# Patient Record
Sex: FEMALE | Race: BLACK OR AFRICAN AMERICAN | NOT HISPANIC OR LATINO | Employment: FULL TIME | URBAN - METROPOLITAN AREA
[De-identification: names, ages, dates, MRNs, and addresses within clinical notes are randomized per-mention and may not be internally consistent; named-entity substitution may affect disease eponyms.]

---

## 2018-01-19 ENCOUNTER — HOSPITAL ENCOUNTER (EMERGENCY)
Facility: HOSPITAL | Age: 43
Discharge: PSYCHIATRIC OR SUBSTANCE USE FACILITY | End: 2018-01-20
Attending: EMERGENCY MEDICINE
Payer: MEDICAID

## 2018-01-19 DIAGNOSIS — F29 PSYCHOSIS, UNSPECIFIED PSYCHOSIS TYPE: Primary | ICD-10-CM

## 2018-01-19 LAB
ALBUMIN SERPL BCP-MCNC: 3.6 G/DL
ALP SERPL-CCNC: 66 U/L
ALT SERPL W/O P-5'-P-CCNC: 24 U/L
AMPHET+METHAMPHET UR QL: NEGATIVE
ANION GAP SERPL CALC-SCNC: 9 MMOL/L
APAP SERPL-MCNC: <3 UG/ML
AST SERPL-CCNC: 21 U/L
B-HCG UR QL: NEGATIVE
BACTERIA #/AREA URNS HPF: ABNORMAL /HPF
BARBITURATES UR QL SCN>200 NG/ML: NEGATIVE
BASOPHILS # BLD AUTO: 0.01 K/UL
BASOPHILS NFR BLD: 0.1 %
BENZODIAZ UR QL SCN>200 NG/ML: NEGATIVE
BILIRUB SERPL-MCNC: 0.6 MG/DL
BILIRUB UR QL STRIP: NEGATIVE
BUN SERPL-MCNC: 21 MG/DL
BZE UR QL SCN: NEGATIVE
CALCIUM SERPL-MCNC: 9.3 MG/DL
CANNABINOIDS UR QL SCN: NEGATIVE
CHLORIDE SERPL-SCNC: 109 MMOL/L
CLARITY UR: CLEAR
CO2 SERPL-SCNC: 28 MMOL/L
COLOR UR: YELLOW
CREAT SERPL-MCNC: 0.8 MG/DL
CREAT UR-MCNC: 63.2 MG/DL
CTP QC/QA: YES
DIFFERENTIAL METHOD: ABNORMAL
EOSINOPHIL # BLD AUTO: 0 K/UL
EOSINOPHIL NFR BLD: 0.3 %
ERYTHROCYTE [DISTWIDTH] IN BLOOD BY AUTOMATED COUNT: 12.8 %
EST. GFR  (AFRICAN AMERICAN): >60 ML/MIN/1.73 M^2
EST. GFR  (NON AFRICAN AMERICAN): >60 ML/MIN/1.73 M^2
ETHANOL SERPL-MCNC: <10 MG/DL
GLUCOSE SERPL-MCNC: 84 MG/DL
GLUCOSE UR QL STRIP: NEGATIVE
HCT VFR BLD AUTO: 42.3 %
HGB BLD-MCNC: 14.1 G/DL
HGB UR QL STRIP: ABNORMAL
KETONES UR QL STRIP: NEGATIVE
LEUKOCYTE ESTERASE UR QL STRIP: ABNORMAL
LYMPHOCYTES # BLD AUTO: 1.3 K/UL
LYMPHOCYTES NFR BLD: 11.6 %
MCH RBC QN AUTO: 28.3 PG
MCHC RBC AUTO-ENTMCNC: 33.3 G/DL
MCV RBC AUTO: 85 FL
METHADONE UR QL SCN>300 NG/ML: NEGATIVE
MICROSCOPIC COMMENT: ABNORMAL
MONOCYTES # BLD AUTO: 0.8 K/UL
MONOCYTES NFR BLD: 7.2 %
NEUTROPHILS # BLD AUTO: 8.9 K/UL
NEUTROPHILS NFR BLD: 80.5 %
NITRITE UR QL STRIP: NEGATIVE
OPIATES UR QL SCN: NEGATIVE
PCP UR QL SCN>25 NG/ML: NEGATIVE
PH UR STRIP: 6 [PH] (ref 5–8)
PLATELET # BLD AUTO: 230 K/UL
PMV BLD AUTO: 11 FL
POTASSIUM SERPL-SCNC: 3.8 MMOL/L
PROT SERPL-MCNC: 7.6 G/DL
PROT UR QL STRIP: NEGATIVE
RBC # BLD AUTO: 4.98 M/UL
RBC #/AREA URNS HPF: 50 /HPF (ref 0–4)
SODIUM SERPL-SCNC: 146 MMOL/L
SP GR UR STRIP: 1.01 (ref 1–1.03)
SQUAMOUS #/AREA URNS HPF: 5 /HPF
TOXICOLOGY INFORMATION: NORMAL
TSH SERPL DL<=0.005 MIU/L-ACNC: 1.75 UIU/ML
URN SPEC COLLECT METH UR: ABNORMAL
UROBILINOGEN UR STRIP-ACNC: 1 EU/DL
WBC # BLD AUTO: 10.98 K/UL
WBC #/AREA URNS HPF: 7 /HPF (ref 0–5)

## 2018-01-19 PROCEDURE — 63600175 PHARM REV CODE 636 W HCPCS: Performed by: EMERGENCY MEDICINE

## 2018-01-19 PROCEDURE — 84443 ASSAY THYROID STIM HORMONE: CPT

## 2018-01-19 PROCEDURE — 81000 URINALYSIS NONAUTO W/SCOPE: CPT

## 2018-01-19 PROCEDURE — 81025 URINE PREGNANCY TEST: CPT | Performed by: EMERGENCY MEDICINE

## 2018-01-19 PROCEDURE — 99285 EMERGENCY DEPT VISIT HI MDM: CPT | Mod: 25

## 2018-01-19 PROCEDURE — 80320 DRUG SCREEN QUANTALCOHOLS: CPT

## 2018-01-19 PROCEDURE — 85025 COMPLETE CBC W/AUTO DIFF WBC: CPT

## 2018-01-19 PROCEDURE — 96372 THER/PROPH/DIAG INJ SC/IM: CPT

## 2018-01-19 PROCEDURE — 80053 COMPREHEN METABOLIC PANEL: CPT

## 2018-01-19 PROCEDURE — 80329 ANALGESICS NON-OPIOID 1 OR 2: CPT

## 2018-01-19 PROCEDURE — 25000003 PHARM REV CODE 250: Performed by: PSYCHIATRY & NEUROLOGY

## 2018-01-19 PROCEDURE — 80307 DRUG TEST PRSMV CHEM ANLYZR: CPT

## 2018-01-19 RX ORDER — RISPERIDONE 0.5 MG/1
1 TABLET ORAL 2 TIMES DAILY
Status: DISCONTINUED | OUTPATIENT
Start: 2018-01-19 | End: 2018-01-20 | Stop reason: HOSPADM

## 2018-01-19 RX ORDER — DIPHENHYDRAMINE HYDROCHLORIDE 50 MG/ML
25 INJECTION INTRAMUSCULAR; INTRAVENOUS
Status: COMPLETED | OUTPATIENT
Start: 2018-01-19 | End: 2018-01-19

## 2018-01-19 RX ORDER — ZIPRASIDONE MESYLATE 20 MG/ML
20 INJECTION, POWDER, LYOPHILIZED, FOR SOLUTION INTRAMUSCULAR
Status: COMPLETED | OUTPATIENT
Start: 2018-01-19 | End: 2018-01-19

## 2018-01-19 RX ADMIN — ZIPRASIDONE MESYLATE 20 MG: 20 INJECTION, POWDER, LYOPHILIZED, FOR SOLUTION INTRAMUSCULAR at 11:01

## 2018-01-19 RX ADMIN — RISPERIDONE 1 MG: 0.5 TABLET, FILM COATED ORAL at 10:01

## 2018-01-19 RX ADMIN — DIPHENHYDRAMINE HYDROCHLORIDE 25 MG: 50 INJECTION, SOLUTION INTRAMUSCULAR; INTRAVENOUS at 11:01

## 2018-01-19 NOTE — ED PROVIDER NOTES
Encounter Date: 2018    SCRIBE #1 NOTE: I, Chadd Valdez, am scribing for, and in the presence of,  Dr. Fallon Ni. I have scribed the entire note.       History     Chief Complaint   Patient presents with    Psychiatric Evaluation     PT presents to ED today via EMS who reports pt was found by [employer] at the [facility] claiming she was a resident there. Per [facility] pt is not a resident there. alert and oriented to self. denies HI/SI. denies taking any medications reports she eat a lot of vegetables.     Time patient was seen by the provider: 3:58 PM      The patient is a 43 y.o. female with co-morbidities including: psychiatric illness who presents to the ED via EMS for a psychiatric issue. Per EMS, who picked her up from [facility], she is alert and oriented, but rambling and seems dissociated. She appears paranoid and nervous. She was recently evicted, and told EMS she walked from [place] to [place]. She states she is in the ED currently for a regular check up. She denies any pain, denies taking any medicine. She states she lives alone. Patient seems dissociated, has not mentioned that she was picked up by EMS and is speaking about people that may or may not exist. Patient denies SI/HI.        The history is provided by the EMS personnel and the patient.     Review of patient's allergies indicates:  No Known Allergies  History reviewed. No pertinent past medical history.  Past Surgical History:   Procedure Laterality Date     SECTION, CLASSIC      TONSILLECTOMY       Family History   Problem Relation Age of Onset    Cancer Mother     Cancer Maternal Grandmother      Social History   Substance Use Topics    Smoking status: Never Smoker    Smokeless tobacco: Not on file    Alcohol use No     Review of Systems   Constitutional: Negative for fever.   HENT: Negative for sore throat.    Respiratory: Negative for shortness of breath.    Cardiovascular: Negative for chest pain.    Gastrointestinal: Negative for nausea.   Genitourinary: Negative for dysuria.   Musculoskeletal: Negative for back pain.   Skin: Negative for color change, rash and wound.   Neurological: Negative for weakness.   Hematological: Does not bruise/bleed easily.   Psychiatric/Behavioral: Negative for suicidal ideas. The patient is nervous/anxious (mildly).         Rambling, dissociated   All other systems reviewed and are negative.      Physical Exam     Initial Vitals [01/19/18 1543]   BP Pulse Resp Temp SpO2   (!) 155/84 (!) 113 20 97.9 °F (36.6 °C) 100 %      MAP       107.67         Physical Exam    Nursing note and vitals reviewed.  Constitutional: She appears well-developed and well-nourished.   HENT:   Head: Normocephalic and atraumatic.   Eyes: Conjunctivae and EOM are normal. Pupils are equal, round, and reactive to light. Right eye exhibits no discharge. Left eye exhibits no discharge. No scleral icterus.   Neck: Normal range of motion. Neck supple.   Cardiovascular: Regular rhythm and normal heart sounds. Exam reveals no gallop and no friction rub.    No murmur heard.  tachycardic   Pulmonary/Chest: Breath sounds normal. No stridor. No respiratory distress. She has no wheezes. She has no rhonchi. She has no rales.   Abdominal: Soft. Bowel sounds are normal. She exhibits no distension and no mass. There is no tenderness. There is no rebound and no guarding.   Neurological: She is alert and oriented to person, place, and time. She has normal strength. No cranial nerve deficit or sensory deficit.   Skin: Skin is warm and dry.   Psychiatric: Her behavior is normal. Judgment and thought content normal. Her mood appears anxious. Her speech is rapid and/or pressured and tangential. She expresses no suicidal plans and no homicidal plans.         ED Course   Procedures  Labs Reviewed   CBC W/ AUTO DIFFERENTIAL - Abnormal; Notable for the following:        Result Value    Gran # 8.9 (*)     Gran% 80.5 (*)     Lymph%  11.6 (*)     All other components within normal limits   COMPREHENSIVE METABOLIC PANEL - Abnormal; Notable for the following:     Sodium 146 (*)     BUN, Bld 21 (*)     All other components within normal limits   URINALYSIS - Abnormal; Notable for the following:     Occult Blood UA 3+ (*)     Leukocytes, UA Trace (*)     All other components within normal limits   ACETAMINOPHEN LEVEL - Abnormal; Notable for the following:     Acetaminophen (Tylenol), Serum <3.0 (*)     All other components within normal limits   URINALYSIS MICROSCOPIC - Abnormal; Notable for the following:     RBC, UA 50 (*)     WBC, UA 7 (*)     All other components within normal limits   TSH   DRUG SCREEN PANEL, URINE EMERGENCY   ALCOHOL,MEDICAL (ETHANOL)   POCT URINE PREGNANCY             Medical Decision Making:   History:   Old Medical Records: I decided to obtain old medical records.  Initial Assessment:   42 y/o female presents with erratic behavior and disorganized thinking. Plan to consult psych consultation by Dr. Murguia, will order PEC on patient.   Differential Diagnosis:   Differential Diagnosis includes, but is not limited to:  Decompensated psychiatric disease (schizophrenia, bipolar disorder, major depression), excited delirium, medication noncompliance, substance abuse/withdrawal, intentional drug overdose, medication toxicity, APAP/ASA overdose, acute stress reaction, personality disorder, malingering, metabolic derangement    Clinical Tests:   Lab Tests: Ordered and Reviewed  ED Management:  Patient medically cleared.                     ED Course      Clinical Impression:   The encounter diagnosis was Psychosis, unspecified psychosis type.    Disposition:   Disposition: Discharged  Condition: Stable     Attending Attestation:     Physician Attestation for Scribe:    I, Dr. Fallon Ni, personally performed the services described in this documentation.   All medical record entries made by the scribe were at my direction and in my  presence.   I have reviewed the chart and agree that the record is accurate and complete.   Fallon Ni MD  8:40 AM 01/20/2018                         Fallon Ni MD  01/20/18 0842

## 2018-01-19 NOTE — ED NOTES
Patient has verified the spelling of their name and  on armband  LOC: The patient is awake, alert, and aware of environment    the patient is oriented, hyperactive, psychiatric history.   APPEARANCE: no acute distress, patient is clean and well groomed, patient pants are wet, pt has on at izabela shirt   SKIN: The skin is warm and dry, color consistent with ethnicity, patient has normal skin turgor and moist mucus membranes, skin intact,   : Voids without difficulty  MUSCULOSKELETAL: Patient moving all extremities spontaneously, no obvious swelling or deformities noted.   RESPIRATORY: Airway is open and patent, respirations are spontaneous, patient has a normal effort and rate, no accessory muscle use noted, bilateral breath sounds clear, denies SOB   ABDOMEN: Soft and non tender to palpation, no distention noted, normoactive bowel sounds present in all four quadrants.   CARDIAC: Normal rate and rhythm, no peripheral edema noted, less then 3 second capillary refill, denies chest pain  COMPLAINT: psychiatric eval

## 2018-01-20 VITALS
OXYGEN SATURATION: 96 % | HEIGHT: 64 IN | RESPIRATION RATE: 18 BRPM | HEART RATE: 110 BPM | BODY MASS INDEX: 22.2 KG/M2 | DIASTOLIC BLOOD PRESSURE: 89 MMHG | TEMPERATURE: 98 F | SYSTOLIC BLOOD PRESSURE: 134 MMHG | WEIGHT: 130 LBS

## 2018-01-20 NOTE — ED NOTES
Pt standing in doorway. Nad. resp unlabored. Calm at this time. Pt belongings clothing and keys In 2 pt belongings bags taken to locked closet escorted by pt and nurse

## 2018-01-20 NOTE — ED NOTES
Spoke with Gricelda at [facility] [phone] she received some of the paperwork. PEC faxed. She will call when she receives PEC with bed availability.

## 2018-01-20 NOTE — ED NOTES
Pt escorted out with 2 [employer] and 2 security officers. Pt discharged with 2 personal bags and copy of chart.

## 2018-01-22 NOTE — PSYCH
"IDENTIFICATION DATA:  This is a 43-year-old  female, who was   brought to ER due to bizarre behavior.  The patient was brought to [facility] ER by EMS and was placed on PEC status.  This consult is requested by Dr. Ni for psych evaluation.  The patient is on a PEC status.    CHIEF COMPLAINT:  "I do not know what happened."    HISTORY OF PRESENTING ILLNESS:  According to the intake note, the patient was at   [facility] and thought that she lived there and according to [facility], she is not residing at [facility].    She ended up in   the ER due to bizarre behavior.  The patient was found to be confused,   dissociated, disorganized, and anxious.  The patient was surprised to hear that   she is in [facility], she thought [place]  She   believed that she came here by herself.  She does not know why she is here.  She   started mumbling and talking to herself.  She was very disorganized and   responding to internal stimuli.  She was laughing inappropriately.  The patient   has poor attention and concentration.  She has some flight of ideas and loose   association.  She does not want to say that it was comfortable standing around   her clothes in the corner.  She states that she is okay.  She does not believe   She was disruptive or acting bizarre.  The patient states that she has an apartment and then uttering a term,   which was not understandable.  The patient was talking to the ER physician and   the ER physician believed that she was talking about people, who may or may not   be existing.  She was jumping from topic to topic including grandparents, her   apartment, and different addresses.  She does not believe that she is confused.    She denies hearing voices, but seems to be responding to internal stimuli.  She   is pleasant and not violent.  She was paranoid upon arrival.  She did not   answer questions related to alcohol and drugs.  Chart " indicates that her alcohol   level was less than 10 and urine drug screen was pending.  The patient was not   able to answer most of the questions, which were related to sleep deprivation,   current stressors,  reason for coming to the hospital.  She states she did   not mention anything about apartment where she was found to be more confused,   disorganized and disoriented.  The patient did not answer questions related to   flashbacks, nightmares and other fear.  The patient states that she is not on   medicine and she has no mental illness.  There was some concern about   schizophrenia.  The patient's old record of [facility] did not say anything about   the mental part; however, she has visited a few times for headaches,   folliculitis, UTI.  The patient states that she has never tried to commit   suicide.  The patient was not able to answer questions related to legal matters   and rehabilitation.    SOCIAL HISTORY:  Unobtainable.  The patient was able to tell to the ER physician   that she lives by herself and she told Dr. Murguia she has a son, who is doing   fine.    MEDICAL HISTORY:  Unremarkable.    The patient's CBC, chemistry, TSH, and UA are within normal limits.  Her alcohol   level is less than 10.  Her urine drug screening is pending.    MENTAL STATUS EXAMINATION:  This is a 43-year-old healthy-looking,    American female, who looks about her stated age.  She is alert, cooperative and   oriented to person only.  She thought that she is at [facility].    She was oriented to month and year.  She did not answer questions about the day   and was distracted.  She has poor attention and concentration.  Her mood is   euthymic with inappropriate affect.  She showed anxiety, paranoia as well as   inappropriate laugh.  She was distracted and mumbling to herself.  She was   hypervigilant and was looking around in a strange way.  She has no tremors,   injuries.  She was standing throughout the  evaluation process and was trying to   say something about the hospital bed.  She denies hearing voices, but seems to   be hallucinating.  She was delusional at the apartment complex and thought that   she lives over there and was adamant about that.  Insight and judgment are   impaired.  She is of average intelligence person.    PSYCHIATRIC DIAGNOSES:  AXIS I:  Psychotic disorder, not otherwise specified.  AXIS II:  No diagnosis.  AXIS III:  No diagnosis.  AXIS IV:  Worried about place.  AXIS V:  35.    RECOMMENDATIONS:  We will transfer this patient to [facility] for   stabilization.  We will initiate treatment with Risperdal 1 mg p.o. b.i.d.  We   will give Ativan 1 mg tonight for sleep and anxiety.  We will try to get   collaborative information.    ASSETS:  The patient is verbal, nonviolent.      AI/HN  dd: 01/19/2018 20:49:39 (CST)  td: 01/20/2018 04:47:21 (CST)  Doc ID   #[id number]  Job ID #[id number]    CC:

## 2018-10-09 ENCOUNTER — HOSPITAL ENCOUNTER (EMERGENCY)
Facility: OTHER | Age: 43
Discharge: LEFT BEFORE DISCHARGE | End: 2018-10-09
Attending: EMERGENCY MEDICINE
Payer: MEDICAID

## 2018-10-09 VITALS
HEART RATE: 80 BPM | BODY MASS INDEX: 22.88 KG/M2 | HEIGHT: 64 IN | DIASTOLIC BLOOD PRESSURE: 97 MMHG | RESPIRATION RATE: 18 BRPM | SYSTOLIC BLOOD PRESSURE: 132 MMHG | TEMPERATURE: 99 F | WEIGHT: 134 LBS

## 2018-10-09 DIAGNOSIS — R35.0 URINARY FREQUENCY: Primary | ICD-10-CM

## 2018-10-09 LAB
B-HCG UR QL: NEGATIVE
BACTERIA #/AREA URNS HPF: ABNORMAL /HPF
BILIRUB UR QL STRIP: NEGATIVE
CLARITY UR: ABNORMAL
COLOR UR: YELLOW
CTP QC/QA: YES
GLUCOSE UR QL STRIP: NEGATIVE
HGB UR QL STRIP: NEGATIVE
KETONES UR QL STRIP: NEGATIVE
LEUKOCYTE ESTERASE UR QL STRIP: ABNORMAL
MICROSCOPIC COMMENT: ABNORMAL
NITRITE UR QL STRIP: NEGATIVE
PH UR STRIP: 6 [PH] (ref 5–8)
PROT UR QL STRIP: NEGATIVE
SP GR UR STRIP: 1.01 (ref 1–1.03)
SQUAMOUS #/AREA URNS HPF: 2 /HPF
URN SPEC COLLECT METH UR: ABNORMAL
UROBILINOGEN UR STRIP-ACNC: NEGATIVE EU/DL
WBC #/AREA URNS HPF: 4 /HPF (ref 0–5)

## 2018-10-09 PROCEDURE — 81025 URINE PREGNANCY TEST: CPT | Performed by: EMERGENCY MEDICINE

## 2018-10-09 PROCEDURE — 99283 EMERGENCY DEPT VISIT LOW MDM: CPT

## 2018-10-09 PROCEDURE — 81000 URINALYSIS NONAUTO W/SCOPE: CPT

## 2018-10-09 NOTE — ED TRIAGE NOTES
"Pt states " I have to have antibiotics. I travel a lot and don't have time to use the bathroom and I don't want my urine to back up. I drink a lot of cranberry juice but I need antibitoics just in case". Pt denies dysuria, urinary frequency, burning, vaginal d/c, fever, chills, n/v/d, sob, cp.   "

## 2018-10-09 NOTE — ED PROVIDER NOTES
"Encounter Date: 10/9/2018    SCRIBE #1 NOTE: I, Ari Calle, am scribing for, and in the presence of, Dr. Spicer .       History     Chief Complaint   Patient presents with    Diarrhea     Pt c/o diarrhea for the past five days. Pt denies abd pain, denies vomiting.      Time seen by provider: 4:34 PM    This is a 43 y.o. female who presents with complaint of urinary urgency that has been present for a few weeks. Patient states she also has been experiencing urinary frequency and foul smelling urine for "a while". She denies fevers, chills, abdominal pain, dysuria, N/V/D, or constipation. She states that she has one bowel movement a week and that it is normal for her. She denies significant past medical history or use of daily prescription medications. Per medical record, patient was seen in the ED for a psychiatric issue six months ago but does not currently take meds. She currently denies SI, HI, auditory hallucinations, or visual hallucinations. She states she lives alone.       The history is provided by the patient.     Review of patient's allergies indicates:  No Known Allergies  No past medical history on file.  Past Surgical History:   Procedure Laterality Date     SECTION, CLASSIC      TONSILLECTOMY       Family History   Problem Relation Age of Onset    Cancer Mother     Cancer Maternal Grandmother      Social History     Tobacco Use    Smoking status: Never Smoker   Substance Use Topics    Alcohol use: No    Drug use: No     Review of Systems   Constitutional: Negative for chills and fever.   HENT: Negative for congestion.    Eyes: Negative for visual disturbance.   Respiratory: Negative for shortness of breath.    Cardiovascular: Negative for chest pain.   Gastrointestinal: Negative for abdominal pain, constipation, diarrhea, nausea and vomiting.   Genitourinary: Positive for frequency and urgency. Negative for dysuria.        Positive for foul smelling urine.    Skin: Negative for " rash.   Neurological: Negative for headaches.   Psychiatric/Behavioral: Negative for hallucinations and suicidal ideas.        Negative for HI.        Physical Exam     Initial Vitals [10/09/18 1511]   BP Pulse Resp Temp SpO2   (!) 132/97 80 18 98.9 °F (37.2 °C) --      MAP       --         Physical Exam    Nursing note and vitals reviewed.  Constitutional: She appears well-developed and well-nourished. No distress.   HENT:   Head: Normocephalic and atraumatic.   Mouth/Throat: Oropharynx is clear and moist.   Eyes: Conjunctivae and EOM are normal. Pupils are equal, round, and reactive to light.   Neck: Normal range of motion. Neck supple.   Cardiovascular: Normal rate, regular rhythm and normal heart sounds.   Pulmonary/Chest: Breath sounds normal. No respiratory distress.   Abdominal: Soft. She exhibits no distension. There is no tenderness.   Musculoskeletal: Normal range of motion.   Neurological: She is alert and oriented to person, place, and time. She has normal strength. No cranial nerve deficit or sensory deficit.   Skin: Skin is warm and dry.   Psychiatric: She has a normal mood and affect.   Disorganized, tangential thought process.          ED Course   Procedures  Labs Reviewed   URINALYSIS, REFLEX TO URINE CULTURE - Abnormal; Notable for the following components:       Result Value    Appearance, UA Hazy (*)     Leukocytes, UA Trace (*)     All other components within normal limits    Narrative:     Preferred Collection Type->Urine, Clean Catch   URINALYSIS MICROSCOPIC - Abnormal; Notable for the following components:    Bacteria, UA Many (*)     All other components within normal limits    Narrative:     Preferred Collection Type->Urine, Clean Catch   POCT URINE PREGNANCY          Imaging Results    None          Medical Decision Making:   Initial Assessment:       43-year-old female presents complaining of increased urinary frequency and concern for UTI.  She is somewhat poor historian and has  disorganized thought process on exam.  Per chart review she has history of psychosis admission in January, but is not currently take any meds.  She denies any hallucinations, SI/HI.  She is able to work and function but not gravely disabled, no indication for emergent PEC.  No flank pain to suggest pyelonephritis, abdominal pain, fevers, or other symptoms. UA with trace leuks and 4 WBC with many bacteria, equivocal for UTI.  Patient eloped prior to getting UA result.      Clinical Tests:   Lab Tests: Ordered and Reviewed            Scribe Attestation:   Scribe #1: I performed the above scribed service and the documentation accurately describes the services I performed. I attest to the accuracy of the note.    Attending Attestation:           Physician Attestation for Scribe:  Physician Attestation Statement for Scribe #1: I, Dr. pSicer, reviewed documentation, as scribed by Air Calle  in my presence, and it is both accurate and complete.                    Clinical Impression:     1. Urinary frequency                                   William Spicer MD  10/10/18 8039

## 2021-03-10 ENCOUNTER — OCCUPATIONAL HEALTH (OUTPATIENT)
Dept: URGENT CARE | Facility: CLINIC | Age: 46
End: 2021-03-10

## 2021-03-10 DIAGNOSIS — Z02.89 ENCOUNTER FOR EXAMINATION REQUIRED BY DEPARTMENT OF TRANSPORTATION (DOT): Primary | ICD-10-CM

## 2021-03-10 PROCEDURE — 92552 PURE TONE AUDIOMETRY AIR: CPT | Mod: S$GLB,,, | Performed by: EMERGENCY MEDICINE

## 2021-03-10 PROCEDURE — 80305 DRUG TEST PRSMV DIR OPT OBS: CPT | Mod: S$GLB,,, | Performed by: EMERGENCY MEDICINE

## 2021-03-10 PROCEDURE — 99499 PHYSICAL, RECERT DOT/CDL: ICD-10-PCS | Mod: S$GLB,,, | Performed by: EMERGENCY MEDICINE

## 2021-03-10 PROCEDURE — 99499 UNLISTED E&M SERVICE: CPT | Mod: S$GLB,,, | Performed by: EMERGENCY MEDICINE

## 2021-03-10 PROCEDURE — 92552 AUDIOGRAM OCC MED: ICD-10-PCS | Mod: S$GLB,,, | Performed by: EMERGENCY MEDICINE

## 2021-03-10 PROCEDURE — 80305 OOH COLLECTION ONLY DRUG SCREEN: ICD-10-PCS | Mod: S$GLB,,, | Performed by: EMERGENCY MEDICINE

## 2022-08-21 ENCOUNTER — HOSPITAL ENCOUNTER (EMERGENCY)
Facility: HOSPITAL | Age: 47
Discharge: PSYCHIATRIC OR SUBSTANCE USE FACILITY | End: 2022-08-21
Attending: EMERGENCY MEDICINE
Payer: MEDICAID

## 2022-08-21 VITALS
HEART RATE: 78 BPM | OXYGEN SATURATION: 99 % | RESPIRATION RATE: 18 BRPM | TEMPERATURE: 98 F | SYSTOLIC BLOOD PRESSURE: 141 MMHG | BODY MASS INDEX: 26.46 KG/M2 | WEIGHT: 155 LBS | DIASTOLIC BLOOD PRESSURE: 73 MMHG | HEIGHT: 64 IN

## 2022-08-21 DIAGNOSIS — F23 ACUTE PSYCHOSIS: Primary | ICD-10-CM

## 2022-08-21 LAB
ALBUMIN SERPL BCP-MCNC: 3.8 G/DL (ref 3.5–5.2)
ALP SERPL-CCNC: 53 U/L (ref 55–135)
ALT SERPL W/O P-5'-P-CCNC: 23 U/L (ref 10–44)
AMPHET+METHAMPHET UR QL: NEGATIVE
ANION GAP SERPL CALC-SCNC: 10 MMOL/L (ref 8–16)
APAP SERPL-MCNC: <3 UG/ML (ref 10–20)
AST SERPL-CCNC: 20 U/L (ref 10–40)
BARBITURATES UR QL SCN>200 NG/ML: NEGATIVE
BASOPHILS # BLD AUTO: 0.02 K/UL (ref 0–0.2)
BASOPHILS NFR BLD: 0.3 % (ref 0–1.9)
BENZODIAZ UR QL SCN>200 NG/ML: NEGATIVE
BILIRUB SERPL-MCNC: 0.8 MG/DL (ref 0.1–1)
BILIRUB UR QL STRIP: NEGATIVE
BUN SERPL-MCNC: 8 MG/DL (ref 6–20)
BZE UR QL SCN: NEGATIVE
CALCIUM SERPL-MCNC: 9.9 MG/DL (ref 8.7–10.5)
CANNABINOIDS UR QL SCN: NEGATIVE
CHLORIDE SERPL-SCNC: 106 MMOL/L (ref 95–110)
CLARITY UR REFRACT.AUTO: ABNORMAL
CO2 SERPL-SCNC: 26 MMOL/L (ref 23–29)
COLOR UR AUTO: YELLOW
CREAT SERPL-MCNC: 0.9 MG/DL (ref 0.5–1.4)
CREAT UR-MCNC: 334 MG/DL (ref 15–325)
CTP QC/QA: YES
DIFFERENTIAL METHOD: ABNORMAL
EOSINOPHIL # BLD AUTO: 0.1 K/UL (ref 0–0.5)
EOSINOPHIL NFR BLD: 2.1 % (ref 0–8)
ERYTHROCYTE [DISTWIDTH] IN BLOOD BY AUTOMATED COUNT: 13.2 % (ref 11.5–14.5)
EST. GFR  (NO RACE VARIABLE): >60 ML/MIN/1.73 M^2
ETHANOL SERPL-MCNC: <10 MG/DL
GLUCOSE SERPL-MCNC: 88 MG/DL (ref 70–110)
GLUCOSE UR QL STRIP: NEGATIVE
HCT VFR BLD AUTO: 43.9 % (ref 37–48.5)
HGB BLD-MCNC: 15 G/DL (ref 12–16)
HGB UR QL STRIP: NEGATIVE
IMM GRANULOCYTES # BLD AUTO: 0.02 K/UL (ref 0–0.04)
IMM GRANULOCYTES NFR BLD AUTO: 0.3 % (ref 0–0.5)
KETONES UR QL STRIP: ABNORMAL
LEUKOCYTE ESTERASE UR QL STRIP: NEGATIVE
LYMPHOCYTES # BLD AUTO: 1.4 K/UL (ref 1–4.8)
LYMPHOCYTES NFR BLD: 22 % (ref 18–48)
MCH RBC QN AUTO: 27.1 PG (ref 27–31)
MCHC RBC AUTO-ENTMCNC: 34.2 G/DL (ref 32–36)
MCV RBC AUTO: 79 FL (ref 82–98)
METHADONE UR QL SCN>300 NG/ML: NEGATIVE
MONOCYTES # BLD AUTO: 0.4 K/UL (ref 0.3–1)
MONOCYTES NFR BLD: 5.8 % (ref 4–15)
NEUTROPHILS # BLD AUTO: 4.3 K/UL (ref 1.8–7.7)
NEUTROPHILS NFR BLD: 69.5 % (ref 38–73)
NITRITE UR QL STRIP: NEGATIVE
NRBC BLD-RTO: 0 /100 WBC
OPIATES UR QL SCN: NEGATIVE
PCP UR QL SCN>25 NG/ML: NEGATIVE
PH UR STRIP: 6 [PH] (ref 5–8)
PLATELET # BLD AUTO: 247 K/UL (ref 150–450)
PMV BLD AUTO: 11.5 FL (ref 9.2–12.9)
POTASSIUM SERPL-SCNC: 3.4 MMOL/L (ref 3.5–5.1)
PROT SERPL-MCNC: 7.1 G/DL (ref 6–8.4)
PROT UR QL STRIP: ABNORMAL
RBC # BLD AUTO: 5.53 M/UL (ref 4–5.4)
SARS-COV-2 RDRP RESP QL NAA+PROBE: NEGATIVE
SODIUM SERPL-SCNC: 142 MMOL/L (ref 136–145)
SP GR UR STRIP: 1.02 (ref 1–1.03)
TOXICOLOGY INFORMATION: ABNORMAL
TSH SERPL DL<=0.005 MIU/L-ACNC: 1.51 UIU/ML (ref 0.4–4)
URN SPEC COLLECT METH UR: ABNORMAL
WBC # BLD AUTO: 6.19 K/UL (ref 3.9–12.7)

## 2022-08-21 PROCEDURE — 82077 ASSAY SPEC XCP UR&BREATH IA: CPT | Performed by: EMERGENCY MEDICINE

## 2022-08-21 PROCEDURE — U0002 COVID-19 LAB TEST NON-CDC: HCPCS | Performed by: EMERGENCY MEDICINE

## 2022-08-21 PROCEDURE — 99282 EMERGENCY DEPT VISIT SF MDM: CPT | Mod: CS,,, | Performed by: EMERGENCY MEDICINE

## 2022-08-21 PROCEDURE — 99285 EMERGENCY DEPT VISIT HI MDM: CPT | Mod: 25

## 2022-08-21 PROCEDURE — 84443 ASSAY THYROID STIM HORMONE: CPT | Performed by: EMERGENCY MEDICINE

## 2022-08-21 PROCEDURE — 80143 DRUG ASSAY ACETAMINOPHEN: CPT | Performed by: EMERGENCY MEDICINE

## 2022-08-21 PROCEDURE — 99282 PR EMERGENCY DEPT VISIT,LEVEL II: ICD-10-PCS | Mod: CS,,, | Performed by: EMERGENCY MEDICINE

## 2022-08-21 PROCEDURE — 80053 COMPREHEN METABOLIC PANEL: CPT | Performed by: EMERGENCY MEDICINE

## 2022-08-21 PROCEDURE — 81003 URINALYSIS AUTO W/O SCOPE: CPT | Mod: 59 | Performed by: EMERGENCY MEDICINE

## 2022-08-21 PROCEDURE — 80307 DRUG TEST PRSMV CHEM ANLYZR: CPT | Performed by: EMERGENCY MEDICINE

## 2022-08-21 PROCEDURE — 86803 HEPATITIS C AB TEST: CPT | Performed by: PHYSICIAN ASSISTANT

## 2022-08-21 PROCEDURE — 85025 COMPLETE CBC W/AUTO DIFF WBC: CPT | Performed by: EMERGENCY MEDICINE

## 2022-08-21 NOTE — ED NOTES
Attempted to call several times to give report to [facility], no answer, will try again.

## 2022-08-21 NOTE — PROVIDER PROGRESS NOTES - EMERGENCY DEPT.
Encounter Date: 8/21/2022    ED Physician Progress Notes         ED Physician Hand-off Note:    ED Course: I assumed care of patient from off-going ED physician team. Briefly, Patient is a 46 yo F with PMH of schizophrenia who presented with signs and symptoms of psychosis  PEC is in place    At the time of signout plan was pending - labs a medical clearance    4:51 PM  Pt is medically cleared for psych placement    Medications given in the ED:    Medications - No data to display    Disposition: transfer to inpatient psych    Impression:Final diagnoses:  [F23] Acute psychosis (Primary)

## 2022-08-21 NOTE — ED PROVIDER NOTES
Encounter Date: 2022       History     Chief Complaint   Patient presents with    Foot Problem     Both feet hurting, talking to self in triage     47-year-old female, history of schizophrenia, presenting with complaints of foot pain bilateral.  Patient is very disorganized historian and when asked why she is here gives different answers every time.  She reports that she needs shelter but cannot elaborate further.    The history is provided by the patient. The history is limited by the absence of a caregiver and the condition of the patient.     Review of patient's allergies indicates:  No Known Allergies  No past medical history on file.  Past Surgical History:   Procedure Laterality Date     SECTION, CLASSIC      TONSILLECTOMY       Family History   Problem Relation Age of Onset    Cancer Mother     Cancer Maternal Grandmother      Social History     Tobacco Use    Smoking status: Never Smoker   Substance Use Topics    Alcohol use: No    Drug use: No     Review of Systems   Unable to perform ROS: Psychiatric disorder       Physical Exam     Initial Vitals [22 1132]   BP Pulse Resp Temp SpO2   (!) 133/95 (!) 112 18 98.1 °F (36.7 °C) 99 %      MAP       --         Physical Exam    Nursing note and vitals reviewed.  Constitutional: Vital signs are normal. She appears well-developed and well-nourished. She is not diaphoretic.  Non-toxic appearance. She does not appear ill. No distress.   HENT:   Head: Normocephalic and atraumatic.   Mouth/Throat: Mucous membranes are normal. Mucous membranes are not dry.   Eyes: Conjunctivae and lids are normal.   Neck: Neck supple.   Normal range of motion.  Cardiovascular: Normal rate.   Musculoskeletal:         General: No edema.      Cervical back: Normal range of motion and neck supple.      Comments: Feet are warm, well perfused, with no bony tenderness, no induration erythema.  Dorsalis pedis pulses are 2+ bilaterally     Neurological: She is alert  and oriented to person, place, and time.   Skin: Skin is dry and intact. No pallor.   Psychiatric: Her behavior is normal. Her affect is labile. Her speech is delayed. She is actively hallucinating. Cognition and memory are impaired. She expresses no homicidal and no suicidal ideation. She is inattentive.         ED Course   Procedures  Labs Reviewed   HIV 1 / 2 ANTIBODY   HEPATITIS C ANTIBODY   CBC W/ AUTO DIFFERENTIAL   COMPREHENSIVE METABOLIC PANEL   TSH   URINALYSIS, REFLEX TO URINE CULTURE   DRUG SCREEN PANEL, URINE EMERGENCY   ALCOHOL,MEDICAL (ETHANOL)   ACETAMINOPHEN LEVEL   SARS-COV-2 RDRP GENE          Imaging Results    None          Medications - No data to display  Medical Decision Making:   History:   Old Medical Records: I decided to obtain old medical records.  Initial Assessment:   Chief complaint is foot pain but patient is appearing acutely psychotic, clearly responding to internal stimuli my assessment, disorganized in her thinking and seemingly psychotic, gravely disabled.  While she has not been here for this before in looks like on review of the EMR patient has had multiple previous admissions for acute psychosis in the setting of decompensated bipolar schizophrenia disease.  On my assessment today the patient he meant denies having any underlying mental health issues.  Clinical Tests:   Lab Tests: Ordered and Reviewed  ED Management:  -labs for medical clearance  -1:1 observation  -PEC placed bc patient is not safe for d/c home either bc of grave disability or acutely suicidal  -psychiatric admission once medically cleared    2:14 PM  Signed out to Dr. Rivera pending lab results                      Clinical Impression:   Final diagnoses:  [F23] Acute psychosis (Primary)          ED Disposition Condition    Transfer to Psych Facility         ED Prescriptions     None        Follow-up Information    None          Ainsley Montiel MD  08/21/22 3680

## 2022-08-22 LAB — HCV AB SERPL QL IA: NEGATIVE

## 2022-08-23 PROBLEM — Z13.9 ENCOUNTER FOR MEDICAL SCREENING EXAMINATION: Status: ACTIVE | Noted: 2022-08-23

## 2022-08-23 PROBLEM — E87.6 HYPOKALEMIA: Status: ACTIVE | Noted: 2022-08-23

## 2022-09-06 PROBLEM — Z13.9 ENCOUNTER FOR MEDICAL SCREENING EXAMINATION: Status: RESOLVED | Noted: 2022-08-23 | Resolved: 2022-09-06

## 2022-09-06 PROBLEM — F20.1 DISORGANIZED SCHIZOPHRENIA: Status: ACTIVE | Noted: 2022-09-06

## 2022-09-06 PROBLEM — E87.6 HYPOKALEMIA: Status: RESOLVED | Noted: 2022-08-23 | Resolved: 2022-09-06

## 2023-06-14 ENCOUNTER — HOSPITAL ENCOUNTER (EMERGENCY)
Facility: HOSPITAL | Age: 48
Discharge: PSYCHIATRIC OR SUBSTANCE USE FACILITY | End: 2023-06-14
Attending: EMERGENCY MEDICINE

## 2023-06-14 VITALS
SYSTOLIC BLOOD PRESSURE: 159 MMHG | RESPIRATION RATE: 18 BRPM | DIASTOLIC BLOOD PRESSURE: 112 MMHG | HEART RATE: 77 BPM | OXYGEN SATURATION: 98 % | TEMPERATURE: 99 F

## 2023-06-14 DIAGNOSIS — F20.9 SCHIZOPHRENIA, UNSPECIFIED TYPE: Primary | ICD-10-CM

## 2023-06-14 LAB
ALBUMIN SERPL BCP-MCNC: 4.1 G/DL (ref 3.5–5.2)
ALP SERPL-CCNC: 64 U/L (ref 55–135)
ALT SERPL W/O P-5'-P-CCNC: 67 U/L (ref 10–44)
AMPHET+METHAMPHET UR QL: NEGATIVE
ANION GAP SERPL CALC-SCNC: 12 MMOL/L (ref 8–16)
APAP SERPL-MCNC: <3 UG/ML (ref 10–20)
AST SERPL-CCNC: 112 U/L (ref 10–40)
B-HCG UR QL: NEGATIVE
BACTERIA #/AREA URNS HPF: ABNORMAL /HPF
BARBITURATES UR QL SCN>200 NG/ML: NEGATIVE
BASOPHILS # BLD AUTO: 0.05 K/UL (ref 0–0.2)
BASOPHILS NFR BLD: 0.3 % (ref 0–1.9)
BENZODIAZ UR QL SCN>200 NG/ML: NEGATIVE
BILIRUB SERPL-MCNC: 1.4 MG/DL (ref 0.1–1)
BILIRUB UR QL STRIP: NEGATIVE
BUN SERPL-MCNC: 17 MG/DL (ref 6–20)
BZE UR QL SCN: NEGATIVE
CALCIUM SERPL-MCNC: 9.6 MG/DL (ref 8.7–10.5)
CANNABINOIDS UR QL SCN: NEGATIVE
CHLORIDE SERPL-SCNC: 104 MMOL/L (ref 95–110)
CLARITY UR: ABNORMAL
CO2 SERPL-SCNC: 24 MMOL/L (ref 23–29)
COLOR UR: YELLOW
CREAT SERPL-MCNC: 1.4 MG/DL (ref 0.5–1.4)
CREAT UR-MCNC: 189.2 MG/DL (ref 15–325)
CTP QC/QA: YES
DIFFERENTIAL METHOD: ABNORMAL
EOSINOPHIL # BLD AUTO: 0.1 K/UL (ref 0–0.5)
EOSINOPHIL NFR BLD: 0.3 % (ref 0–8)
ERYTHROCYTE [DISTWIDTH] IN BLOOD BY AUTOMATED COUNT: 13.2 % (ref 11.5–14.5)
EST. GFR  (NO RACE VARIABLE): 47 ML/MIN/1.73 M^2
ETHANOL SERPL-MCNC: <10 MG/DL
GLUCOSE SERPL-MCNC: 99 MG/DL (ref 70–110)
GLUCOSE UR QL STRIP: NEGATIVE
HCT VFR BLD AUTO: 47.3 % (ref 37–48.5)
HGB BLD-MCNC: 15.7 G/DL (ref 12–16)
HGB UR QL STRIP: ABNORMAL
HYALINE CASTS #/AREA URNS LPF: 0 /LPF
IMM GRANULOCYTES # BLD AUTO: 0.04 K/UL (ref 0–0.04)
IMM GRANULOCYTES NFR BLD AUTO: 0.3 % (ref 0–0.5)
KETONES UR QL STRIP: ABNORMAL
LEUKOCYTE ESTERASE UR QL STRIP: ABNORMAL
LYMPHOCYTES # BLD AUTO: 2.9 K/UL (ref 1–4.8)
LYMPHOCYTES NFR BLD: 20.4 % (ref 18–48)
MCH RBC QN AUTO: 27.1 PG (ref 27–31)
MCHC RBC AUTO-ENTMCNC: 33.2 G/DL (ref 32–36)
MCV RBC AUTO: 82 FL (ref 82–98)
METHADONE UR QL SCN>300 NG/ML: NEGATIVE
MICROSCOPIC COMMENT: ABNORMAL
MONOCYTES # BLD AUTO: 1.2 K/UL (ref 0.3–1)
MONOCYTES NFR BLD: 8 % (ref 4–15)
NEUTROPHILS # BLD AUTO: 10.2 K/UL (ref 1.8–7.7)
NEUTROPHILS NFR BLD: 70.7 % (ref 38–73)
NITRITE UR QL STRIP: NEGATIVE
NRBC BLD-RTO: 0 /100 WBC
OPIATES UR QL SCN: NEGATIVE
PCP UR QL SCN>25 NG/ML: NEGATIVE
PH UR STRIP: 6 [PH] (ref 5–8)
PLATELET # BLD AUTO: 258 K/UL (ref 150–450)
PMV BLD AUTO: 11 FL (ref 9.2–12.9)
POTASSIUM SERPL-SCNC: 3.4 MMOL/L (ref 3.5–5.1)
PROT SERPL-MCNC: 7.8 G/DL (ref 6–8.4)
PROT UR QL STRIP: ABNORMAL
RBC # BLD AUTO: 5.79 M/UL (ref 4–5.4)
RBC #/AREA URNS HPF: 13 /HPF (ref 0–4)
SODIUM SERPL-SCNC: 140 MMOL/L (ref 136–145)
SP GR UR STRIP: 1.02 (ref 1–1.03)
SQUAMOUS #/AREA URNS HPF: 9 /HPF
TOXICOLOGY INFORMATION: NORMAL
TSH SERPL DL<=0.005 MIU/L-ACNC: 1.79 UIU/ML (ref 0.4–4)
URN SPEC COLLECT METH UR: ABNORMAL
UROBILINOGEN UR STRIP-ACNC: NEGATIVE EU/DL
WBC # BLD AUTO: 14.38 K/UL (ref 3.9–12.7)
WBC #/AREA URNS HPF: 48 /HPF (ref 0–5)

## 2023-06-14 PROCEDURE — 82077 ASSAY SPEC XCP UR&BREATH IA: CPT | Performed by: EMERGENCY MEDICINE

## 2023-06-14 PROCEDURE — 80143 DRUG ASSAY ACETAMINOPHEN: CPT | Performed by: EMERGENCY MEDICINE

## 2023-06-14 PROCEDURE — 81025 URINE PREGNANCY TEST: CPT | Performed by: EMERGENCY MEDICINE

## 2023-06-14 PROCEDURE — 80307 DRUG TEST PRSMV CHEM ANLYZR: CPT | Performed by: EMERGENCY MEDICINE

## 2023-06-14 PROCEDURE — 96372 THER/PROPH/DIAG INJ SC/IM: CPT | Performed by: EMERGENCY MEDICINE

## 2023-06-14 PROCEDURE — 85025 COMPLETE CBC W/AUTO DIFF WBC: CPT | Performed by: EMERGENCY MEDICINE

## 2023-06-14 PROCEDURE — 63600175 PHARM REV CODE 636 W HCPCS: Performed by: EMERGENCY MEDICINE

## 2023-06-14 PROCEDURE — 81000 URINALYSIS NONAUTO W/SCOPE: CPT | Mod: 59 | Performed by: EMERGENCY MEDICINE

## 2023-06-14 PROCEDURE — 99285 EMERGENCY DEPT VISIT HI MDM: CPT | Mod: 25

## 2023-06-14 PROCEDURE — 87086 URINE CULTURE/COLONY COUNT: CPT | Performed by: EMERGENCY MEDICINE

## 2023-06-14 PROCEDURE — 84443 ASSAY THYROID STIM HORMONE: CPT | Performed by: EMERGENCY MEDICINE

## 2023-06-14 PROCEDURE — 25000003 PHARM REV CODE 250: Performed by: EMERGENCY MEDICINE

## 2023-06-14 PROCEDURE — 80053 COMPREHEN METABOLIC PANEL: CPT | Performed by: EMERGENCY MEDICINE

## 2023-06-14 RX ORDER — HALOPERIDOL 5 MG/ML
5 INJECTION INTRAMUSCULAR
Status: COMPLETED | OUTPATIENT
Start: 2023-06-14 | End: 2023-06-14

## 2023-06-14 RX ORDER — AMLODIPINE BESYLATE 5 MG/1
5 TABLET ORAL
Status: COMPLETED | OUTPATIENT
Start: 2023-06-14 | End: 2023-06-14

## 2023-06-14 RX ORDER — LORAZEPAM 2 MG/ML
1 INJECTION INTRAMUSCULAR
Status: COMPLETED | OUTPATIENT
Start: 2023-06-14 | End: 2023-06-14

## 2023-06-14 RX ORDER — DIPHENHYDRAMINE HYDROCHLORIDE 50 MG/ML
25 INJECTION INTRAMUSCULAR; INTRAVENOUS
Status: COMPLETED | OUTPATIENT
Start: 2023-06-14 | End: 2023-06-14

## 2023-06-14 RX ADMIN — AMLODIPINE BESYLATE 5 MG: 5 TABLET ORAL at 06:06

## 2023-06-14 RX ADMIN — LORAZEPAM 1 MG: 2 INJECTION INTRAMUSCULAR; INTRAVENOUS at 12:06

## 2023-06-14 RX ADMIN — DIPHENHYDRAMINE HYDROCHLORIDE 25 MG: 50 INJECTION INTRAMUSCULAR; INTRAVENOUS at 12:06

## 2023-06-14 RX ADMIN — HALOPERIDOL LACTATE 5 MG: 5 INJECTION, SOLUTION INTRAMUSCULAR at 12:06
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
## 2023-06-14 NOTE — ED PROVIDER NOTES
"Emergency Department Encounter  Provider Note  Encounter Date: 2023    Patient Name: Renate Camejo  MRN: [id number]    History of Present Illness   HPI  History of Present Illness:    Chief Complaint:   Chief Complaint   Patient presents with    Possible Pregnancy     Pt believes she is approx one month pregnant because she "can feel it". Pt here for evaluation today due to no longer "feeling it". [place].        48f with hx schizophrenia pw paranoia, needing to 'evacuate [place]' because 'nothing works, only the hospital is on, the roads are off, the neighborhoods, everything is dark and black and we can't be here.' Pt denies SI or HI. [place] and [facility] bc initially she stated she was pregnant.     The following PMH/PSH/SocHx/FamHx has been reviewed by myself:    No past medical history on file.  Past Surgical History:   Procedure Laterality Date     SECTION, CLASSIC      TONSILLECTOMY       Social History     Tobacco Use    Smoking status: Never   Substance Use Topics    Alcohol use: No    Drug use: No     Family History   Problem Relation Age of Onset    Cancer Mother     Cancer Maternal Grandmother        Allergies reviewed:  Review of patient's allergies indicates:  No Known Allergies    Medications reviewed:  Medication List with Changes/Refills   Current Medications    RISPERIDONE (RISPERDAL) 3 MG TAB    Take 1 tablet (3 mg total) by mouth 2 (two) times daily. for 14 days       ROS  Review of Systems:  Unable to assess given psychosis    Physical Exam   Physical Exam    Initial Vitals [23 1030]   BP Pulse Resp Temp SpO2   (!) 187/105 90 18 97.7 °F (36.5 °C) 98 %      MAP       --           Triage vital signs reviewed.    Constitutional: Well-nourished, well-developed. Not in acute distress.  HENT: Normocephalic, atraumatic. Moist mucous membranes.  Eyes: No conjunctival injection.  Resp: Normal respiratory effort, breathing " unlabored.  Cardio: Regular rate and rhythm.  GI: Abdomen non-distended.   MSK: Extremities without any deformities noted. No lower extremity edema.  Skin: Warm and dry. No rashes or lesions noted.  Neuro: Awake and alert. Moves all extremities.    ED Course   Procedures    Medical Decision Making    History Acquisition     The history is provided by the patient.   Assessment requiring an independent historian and why historian was needed: EMS, pt is psychotic    Review of prior external/non ED notes: dc summary from psych    Differential Diagnoses   Based on available information and initial assessment, the working Differential Diagnosis includes, but is not limited to:  Decompensated psychiatric disease (schizophrenia, bipolar disorder, major depression), excited delirium, medication noncompliance, substance abuse/withdrawal, intentional drug overdose, medication toxicity, APAP/ASA overdose, acute stress reaction, personality disorder, malingering, metabolic derangement  .    EKG   EKG ordered and independently reviewed by me:       Labs   Lab tests ordered and independently reviewed by me:    Labs Reviewed   CBC W/ AUTO DIFFERENTIAL - Abnormal; Notable for the following components:       Result Value    WBC 14.38 (*)     RBC 5.79 (*)     Gran # (ANC) 10.2 (*)     Mono # 1.2 (*)     All other components within normal limits   COMPREHENSIVE METABOLIC PANEL - Abnormal; Notable for the following components:    Potassium 3.4 (*)     Total Bilirubin 1.4 (*)      (*)     ALT 67 (*)     eGFR 47 (*)     All other components within normal limits   URINALYSIS, REFLEX TO URINE CULTURE - Abnormal; Notable for the following components:    Appearance, UA Hazy (*)     Protein, UA 1+ (*)     Ketones, UA 1+ (*)     Occult Blood UA 1+ (*)     Leukocytes, UA 3+ (*)     All other components within normal limits    Narrative:     Specimen Source->Urine   ACETAMINOPHEN LEVEL - Abnormal; Notable for the following components:     Acetaminophen (Tylenol), Serum <3.0 (*)     All other components within normal limits   URINALYSIS MICROSCOPIC - Abnormal; Notable for the following components:    RBC, UA 13 (*)     WBC, UA 48 (*)     All other components within normal limits    Narrative:     Specimen Source->Urine   CULTURE, URINE   TSH   DRUG SCREEN PANEL, URINE EMERGENCY    Narrative:     Specimen Source->Urine   ALCOHOL,MEDICAL (ETHANOL)   POCT URINE PREGNANCY         Imaging   Imaging ordered and independently reviewed by me:   Imaging Results    None              Additional Consideration   Renate Camejo  presents to the emergency Department today with psychosis. No SI, HI. Pt very disorganized, hallucinating/paranoid. Gravely disabled. PEC, labs.     Additional testing considered but not indicated during the course of this workup: further imaging and labwork, not indicated  Co-morbidities/chronic illness/exacerbation of chronic illness considered which impacted clinical decision making: schizophrenia  Procedures done in the ED or plan for the OR: No  Social determinants of care considered during development of treatment plan include: Decreased medical literacy, psychiatric hx  Discussion of management or test interpretation with external provider: No  DNR discussion: No    The patient's list of active medications and allergies as documented per RN staff has been reviewed.  Medications given in the ED and/or prescribed: Medications - No data to display          ED Course as of 06/14/23 1157   Wed Jun 14, 2023   1155 CBC auto differential(!) [CS]   1155 Comprehensive metabolic panel(!) [CS]   1155 POCT urine pregnancy [CS]   1155 Drug screen panel, emergency [CS]   1155 Urinalysis Microscopic(!) [CS]   1155 Ethanol [CS]   1155 TSH [CS]   1155 Acetaminophen level(!) [CS]   1155 After a brief and focused history and physical exam, I do not find any outward signs of medical catastophe or unstable condition. This type of evaluation does not  include illnesses or conditions that may be latent or chronic in nature. For this type of evaluation, the patient will need good health care follow up with primary care. Patient is medically clear for inpatient psychiatric evaluation.     [CS]      ED Course User Index  [CS] Delfina Vincent MD       Explanation of disposition: Transfer for Psychiatry placement    Clinical Impression:     1. Schizophrenia, unspecified type       ED Disposition Condition    Transfer to Psych Facility Stable               Delfian Vincent MD  06/14/23 5720

## 2023-06-14 NOTE — ED NOTES
Pt appears to be responding to internal stimuli. Pt yelling and cursing at someone in the room.verbal redirection not successful. MD notified of escalating behavior.

## 2023-06-14 NOTE — ED NOTES
This RN returned from lunch, pt sleeping and calm, consulted with Dr. Vincent to remove restraints, MD approved.
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
## 2023-06-15 PROBLEM — R82.90 ABNORMAL URINALYSIS: Status: ACTIVE | Noted: 2023-06-15

## 2023-06-15 PROBLEM — R03.0 ELEVATED BP WITHOUT DIAGNOSIS OF HYPERTENSION: Status: ACTIVE | Noted: 2023-06-15

## 2023-06-15 PROBLEM — R79.89 ELEVATED LFTS: Status: ACTIVE | Noted: 2023-06-15

## 2023-06-15 PROBLEM — D72.829 ELEVATED WBC COUNT: Status: ACTIVE | Noted: 2023-06-15

## 2023-06-15 LAB
BACTERIA UR CULT: NORMAL
BACTERIA UR CULT: NORMAL